# Patient Record
Sex: MALE | Race: OTHER | Employment: UNEMPLOYED | URBAN - NONMETROPOLITAN AREA
[De-identification: names, ages, dates, MRNs, and addresses within clinical notes are randomized per-mention and may not be internally consistent; named-entity substitution may affect disease eponyms.]

---

## 2020-03-04 ENCOUNTER — HOSPITAL ENCOUNTER (EMERGENCY)
Age: 30
Discharge: HOME | End: 2020-03-04

## 2020-03-04 VITALS
TEMPERATURE: 98.6 F | OXYGEN SATURATION: 98 % | SYSTOLIC BLOOD PRESSURE: 140 MMHG | WEIGHT: 153 LBS | HEART RATE: 82 BPM | DIASTOLIC BLOOD PRESSURE: 91 MMHG | RESPIRATION RATE: 16 BRPM

## 2020-03-04 PROCEDURE — 12001 RPR S/N/AX/GEN/TRNK 2.5CM/<: CPT

## 2020-03-04 PROCEDURE — 99212 OFFICE O/P EST SF 10 MIN: CPT

## 2020-03-04 PROCEDURE — 12001 RPR S/N/AX/GEN/TRNK 2.5CM/<: CPT | Performed by: NURSE PRACTITIONER

## 2020-03-04 PROCEDURE — 99202 OFFICE O/P NEW SF 15 MIN: CPT | Performed by: NURSE PRACTITIONER

## 2020-03-04 RX ORDER — LIDOCAINE HYDROCHLORIDE 10 MG/ML
INJECTION, SOLUTION INFILTRATION; PERINEURAL
Status: DISCONTINUED
Start: 2020-03-04 | End: 2020-03-04 | Stop reason: HOSPADM

## 2020-03-04 RX ORDER — CEPHALEXIN 500 MG/1
500 CAPSULE ORAL 4 TIMES DAILY
Qty: 40 CAPSULE | Refills: 0 | Status: SHIPPED | OUTPATIENT
Start: 2020-03-04 | End: 2020-03-14

## 2020-03-04 ASSESSMENT — PAIN DESCRIPTION - LOCATION: LOCATION: HAND

## 2020-03-04 ASSESSMENT — PAIN SCALES - GENERAL: PAINLEVEL_OUTOF10: 2

## 2020-03-04 ASSESSMENT — ENCOUNTER SYMPTOMS: COLOR CHANGE: 0

## 2020-03-04 ASSESSMENT — PAIN DESCRIPTION - PAIN TYPE: TYPE: ACUTE PAIN

## 2020-03-04 ASSESSMENT — PAIN DESCRIPTION - ORIENTATION: ORIENTATION: LEFT

## 2020-03-04 NOTE — ED PROVIDER NOTES
[facility] 35  Urgent Care Encounter       CHIEF COMPLAINT       Chief Complaint   Patient presents with    Wound Check    Hand Laceration       Nurses Notes reviewed and I agree except as noted in the HPI. HISTORY OF PRESENT ILLNESS   Alfredo Lira is a 26 y.o. male who presents to the urgent care center with a laceration to the top of the left hand that happened last night. The patient apparently was trying to open a bottle with other bottles when he got cut with a piece of glass. The patient did not put a dressing over the wound and came in today for evaluation. At this time the wound is approximately 25 hours old. He stated happened around 10:00 last night. There is no bleeding at the present time patient does have some erythema developing to that area as well as some soft tissue swelling. The history is provided by the patient. The history is limited by a language barrier. A  was used (Use of the  iPad). Laceration   Location:  Hand  Hand laceration location:  L hand and dorsum of L hand  Length:  2.5 cm  Depth: Through dermis  Bleeding: venous    Time since incident:  1 day  Laceration mechanism:  Broken glass  Pain details:     Quality:  Aching    Severity:  Mild    Progression:  Unchanged  Foreign body present:  No foreign bodies  Relieved by:  Nothing  Worsened by:  Nothing  Ineffective treatments:  None tried  Tetanus status:  Up to date  Associated symptoms: redness        REVIEW OF SYSTEMS     Review of Systems   Constitutional: Positive for activity change. Musculoskeletal: Negative for arthralgias and joint swelling. Skin: Positive for wound. Negative for color change. Dorsal aspect of the left hand toward the base of the thumb       PAST MEDICAL HISTORY   History reviewed. No pertinent past medical history. SURGICALHISTORY     Patient  has a past surgical history that includes Abdomen surgery.     CURRENT MEDICATIONS for any concerns   or go to the Emergency Department      PATIENT REFERRED TO:  No primary care provider on file. No primary physician on file.       DISCHARGE MEDICATIONS:  Discharge Medication List as of 3/4/2020  3:54 PM      START taking these medications    Details   cephALEXin (KEFLEX) 500 MG capsule Take 1 capsule by mouth 4 times daily for 10 days, Disp-40 capsule, R-0Print             Discharge Medication List as of 3/4/2020  3:54 PM          Discharge Medication List as of 3/4/2020  3:54 PM          WADE Stover CNP    (Please note that portions of this note were completed with a voice recognition program. Efforts were made to edit the dictations but occasionally words are mis-transcribed.)           WADE Stover CNP  03/04/20 1122 [facility], APRN - CNP  03/04/20 1414